# Patient Record
Sex: FEMALE | Employment: UNEMPLOYED | URBAN - METROPOLITAN AREA
[De-identification: names, ages, dates, MRNs, and addresses within clinical notes are randomized per-mention and may not be internally consistent; named-entity substitution may affect disease eponyms.]

---

## 2017-04-08 ENCOUNTER — TELEPHONE (OUTPATIENT)
Dept: FAMILY MEDICINE | Facility: CLINIC | Age: 42
End: 2017-04-08

## 2017-04-08 NOTE — TELEPHONE ENCOUNTER
Returned call from the after clinic pager. Patient thinks she may have bought a bad milk yesterday. She drank approximately 8 ox of it then had diarrhea with some nausea but no vomiting. Nobody else in her family has the same symptoms. No fever , have crampy abdominal pain before bowel movement but not constant. No t worsening. Diarrhea stopped ay 3 am, she has been able to eat a little now. From her story it is possible she has some acute gastroenteritis . I have reassured her that this conditions are self limitting and that treatment is symptomatic. I advised volume per volume replacement Gatorade. Avoid oily , spicy and dairy for the next couple of days. Advised bland food. To follow up in clinic if things are not getting better ir if she develops fever, severe abdominal pain, vomiting with dehydration.       Shirley Lerma MD  PGY 3 [facility]  Family Medicine  Pager # [phone]

## 2017-11-17 DIAGNOSIS — F33.1 MODERATE EPISODE OF RECURRENT MAJOR DEPRESSIVE DISORDER (H): ICD-10-CM

## 2017-11-17 NOTE — TELEPHONE ENCOUNTER
Request for medication refill:    Date of last visit at clinic: 12-1-16    Please complete refill if appropriate and CLOSE ENCOUNTER.    Closing the encounter signifies the refill is complete.    If refill has been denied, please complete the smart phrase .smirefuse and route it to the [facility] RN TRIAGE pool to inform the patient and the pharmacy.    Meredith Frank MA

## 2017-11-17 NOTE — LETTER
Dear Arleen,    We tried to reach you several times by phone and left messages. Dr Babcock refilled your Celexa for 30 days. You haven't been seen in clinic since 12/1/2016. Please call our clinic at [phone] to schedule an appointment with any provider for further refills.    Thank you!    Sincerely,  [facility]

## 2017-11-19 RX ORDER — CITALOPRAM HYDROBROMIDE 20 MG/1
20 TABLET ORAL DAILY
Qty: 30 TABLET | Refills: 0 | Status: SHIPPED | OUTPATIENT
Start: 2017-11-19

## 2017-11-20 NOTE — TELEPHONE ENCOUNTER
RN attempted to call patient. Unable to reach, left VM with name and callback number    When pt calls back please relay message from Dr. Babcock and schedule appt in clinic within 1 month    Nancy Vogel RN

## 2017-11-20 NOTE — TELEPHONE ENCOUNTER
30 day supply refilled. Patient has not been seen in clinic since 12/1/2016.   Should be seen in clinic for follow up prior to next refill.     Cinthia Babcock MD

## 2017-11-22 NOTE — TELEPHONE ENCOUNTER
2nd attempt to reach patient to relay message. RN left VM on both numbers with name and call back number    Nancy Vogel RN

## 2017-11-27 NOTE — TELEPHONE ENCOUNTER
3rd and final attempt to reach pt. Left name and callback number on VM. RN sent letter    Nancy Vogel RN

## 2018-02-22 ENCOUNTER — TELEPHONE (OUTPATIENT)
Dept: FAMILY MEDICINE | Facility: CLINIC | Age: 43
End: 2018-02-22

## 2018-02-22 NOTE — TELEPHONE ENCOUNTER
"[facility] phone call message- patient requesting a refill:    Full Medication Name: minocycline (MINOCIN/DYNACIN) 50 MG capsule    Dose: Take 1 capsule (50 mg) by mouth daily as needed    Pharmacy confirmed as   [id number] - [place], [place] - [address] AT [facility] [address]  [address]  [facility] [address]  Phone: [phone] Fax: [phone]    [facility] - [place], [place] - [address]  [address]  [address]  [facility] [address]  Phone: [phone] Fax: [phone]    [facility] - [place], [place] - [address]  [address]  [place] [address]  Phone: [phone] Fax: [phone]    [facility] - [place], [place] - [address]  [address]  [facility] [address]  Phone: [phone] Fax: [phone]    [facility]  [address] [address]  Phone: [phone] Fax: [phone]    [facility] - [place], [place] - [address] AT [facility] [address]  [address]  [place] [address]  Phone: [phone] Fax: [phone]    [id number] - [place], [place] - [address] [facility] [address]  [address] [place] [address]  Phone: [phone] Fax: [phone]    [facility] #[id number] - [place], [place] - [address] AT [facility]  [address]  [place] [address]  Phone: [phone] Fax: [phone]    [facility] - [place], [place] - [address]  [address] [address]  Phone: [phone] Fax: [phone]  : Yes    Additional Comments: Per Pharmacist, \"a request for above medication was faxed to [facility], we have not received / heard anything back\". Patient's last clinic visit was 12/01/2016 with .    OK to leave a message on voice mail? Yes    Primary language: English     "  needed? No    Call taken on February 22, 2018 at 8:43 AM by Jazmyne Hewitt

## 2018-02-23 DIAGNOSIS — L70.9 ACNE, UNSPECIFIED ACNE TYPE: ICD-10-CM

## 2018-02-23 RX ORDER — MINOCYCLINE HYDROCHLORIDE 50 MG/1
50 CAPSULE ORAL DAILY PRN
Qty: 90 CAPSULE | Refills: 4 | Status: SHIPPED | OUTPATIENT
Start: 2018-02-23

## 2018-02-23 NOTE — TELEPHONE ENCOUNTER
Request for medication refill:    Date of last visit at clinic: 12/1/2016    Please complete refill if appropriate and CLOSE ENCOUNTER.    Closing the encounter signifies the refill is complete.    If refill has been denied, please complete the smart phrase .smirefuse and route it to the [facility] RN TRIAGE pool to inform the patient and the pharmacy.    Milady Lea, CMA

## 2018-04-20 DIAGNOSIS — Z71.89 OTHER SPECIFIED COUNSELING: ICD-10-CM

## 2018-04-20 NOTE — TELEPHONE ENCOUNTER
Date of last visit at clinic: 10/17/2016    Please complete refill and CLOSE ENCOUNTER.  Closing the encounter signifies the refill is complete.

## 2018-09-24 ENCOUNTER — TELEPHONE (OUTPATIENT)
Dept: FAMILY MEDICINE | Facility: CLINIC | Age: 43
End: 2018-09-24

## 2018-09-24 DIAGNOSIS — F17.200 TOBACCO USE DISORDER: Primary | ICD-10-CM

## 2018-09-24 NOTE — TELEPHONE ENCOUNTER
"Request for medication refill: Nicotine 14mg/24 hr, not on patient med list    Providers if patient needs an appointment and you are willing to give a one month supply please refill for one month and  send a letter/MyChart using \".SMILLIMITEDREFILL\" .smillimited and route chart to \"[facility] \" (Giving one month refill in non controlled medications is strongly recommended before denial)    If refill has been denied, meaning absolutely no refills without visit, please complete the smart phrase \".smirxrefuse\" and route it to the \"[facility] MED REFILLS\"  pool to inform the patient and the pharmacy.    Joyce Gallego CMA        "

## 2018-09-26 RX ORDER — NICOTINE 21 MG/24HR
1 PATCH, TRANSDERMAL 24 HOURS TRANSDERMAL EVERY 24 HOURS
Qty: 30 PATCH | Refills: 0 | Status: SHIPPED | OUTPATIENT
Start: 2018-09-26

## 2018-09-26 NOTE — TELEPHONE ENCOUNTER
Nicotine patch prescription sent to preferred pharmacy.     Cinthia Babcock MD  Family Medicine PGY3 Resident

## 2019-10-01 ENCOUNTER — HEALTH MAINTENANCE LETTER (OUTPATIENT)
Age: 44
End: 2019-10-01

## 2020-03-22 ENCOUNTER — HEALTH MAINTENANCE LETTER (OUTPATIENT)
Age: 45
End: 2020-03-22

## 2021-01-15 ENCOUNTER — HEALTH MAINTENANCE LETTER (OUTPATIENT)
Age: 46
End: 2021-01-15

## 2021-01-23 ENCOUNTER — HEALTH MAINTENANCE LETTER (OUTPATIENT)
Age: 46
End: 2021-01-23

## 2021-10-24 ENCOUNTER — HEALTH MAINTENANCE LETTER (OUTPATIENT)
Age: 46
End: 2021-10-24

## 2022-02-13 ENCOUNTER — HEALTH MAINTENANCE LETTER (OUTPATIENT)
Age: 47
End: 2022-02-13

## 2022-10-16 ENCOUNTER — HEALTH MAINTENANCE LETTER (OUTPATIENT)
Age: 47
End: 2022-10-16

## 2023-03-26 ENCOUNTER — HEALTH MAINTENANCE LETTER (OUTPATIENT)
Age: 48
End: 2023-03-26